# Patient Record
Sex: MALE | Race: BLACK OR AFRICAN AMERICAN | HISPANIC OR LATINO | URBAN - METROPOLITAN AREA
[De-identification: names, ages, dates, MRNs, and addresses within clinical notes are randomized per-mention and may not be internally consistent; named-entity substitution may affect disease eponyms.]

---

## 2023-04-29 ENCOUNTER — EMERGENCY (EMERGENCY)
Facility: HOSPITAL | Age: 23
LOS: 1 days | Discharge: HOME | End: 2023-04-29
Attending: EMERGENCY MEDICINE
Payer: MEDICAID

## 2023-04-29 VITALS
HEIGHT: 71 IN | DIASTOLIC BLOOD PRESSURE: 84 MMHG | SYSTOLIC BLOOD PRESSURE: 157 MMHG | HEART RATE: 94 BPM | TEMPERATURE: 99 F | RESPIRATION RATE: 17 BRPM | WEIGHT: 197.98 LBS | OXYGEN SATURATION: 99 %

## 2023-04-29 VITALS
DIASTOLIC BLOOD PRESSURE: 82 MMHG | TEMPERATURE: 99 F | OXYGEN SATURATION: 99 % | SYSTOLIC BLOOD PRESSURE: 132 MMHG | HEART RATE: 89 BPM | RESPIRATION RATE: 18 BRPM

## 2023-04-29 PROCEDURE — 99282 EMERGENCY DEPT VISIT SF MDM: CPT

## 2023-04-29 PROCEDURE — 99284 EMERGENCY DEPT VISIT MOD MDM: CPT

## 2023-04-29 RX ADMIN — Medication 200 MILLIGRAM(S): at 12:38

## 2023-04-29 NOTE — ED PROVIDER NOTE - OBJECTIVE STATEMENT
21yo M, denies medical history, presents with cough, HA, and sinus congestion for 1 week. States symptoms started while he was in the [place].  Has tried teas, tylenol, vit C, Vicks, and zinc with no relief. Denies any sick contacts. Feels like symptoms are about the same/slightly worsening. Also with subjective fevers. Denies any chest pain, shortness of breath, nausea, vomiting, blurry vision, abdominal pain.

## 2023-04-29 NOTE — ED ADULT NURSE NOTE - OBJECTIVE STATEMENT
pt recently returned from [facility].  he got sick with congeston and headache and sore throat while there.   he also lost his voice while there.  today he can talk but has eye pain and throat discomfort

## 2023-04-29 NOTE — ED PROVIDER NOTE - CLINICAL SUMMARY MEDICAL DECISION MAKING FREE TEXT BOX
23yo M, denies medical history, presents with cough, HA, and sinus congestion for 1 week. 23yo M, denies medical history, presents with cough, HA, and sinus congestion for 1 week. Vitals unremarkable. Physical exam unremarkable as well, lungs clear, heart rrr, abdomen soft, nontender. Likely viral URI, will give symptomatic relief, return precautions, likely dc

## 2023-04-29 NOTE — ED PROVIDER NOTE - PATIENT PORTAL LINK FT
You can access the FollowMyHealth Patient Portal offered by [facility] by registering at the following website: [email or web address]. By joining [facility]’s FollowMyHealth portal, you will also be able to view your health information using other applications (apps) compatible with our system.

## 2023-04-29 NOTE — ED PROVIDER NOTE - ATTENDING CONTRIBUTION TO CARE
21yo M, denies medical history, presents with cough, HA, and sinus congestion for 1 week [place] being in [place] contact with a family member there.  Patient did not swab for COVID or flu.  Patient denies any fever but has URI symptoms associated with sinus congestion and a mild right sided headache mild tenderness to palpation to the maxillary and frontal sinus well-appearing.  Did not take anything for headache today does not anything at this time.  Recommended decongestant supportive care otherwise follow-up.

## 2023-04-29 NOTE — ED ADULT NURSE NOTE - NSIMPLEMENTINTERV_GEN_ALL_ED
Implemented All Universal Safety Interventions:  [place] to call system. Call bell, personal items and telephone within reach. Instruct patient to call for assistance. Room bathroom lighting operational. Non-slip footwear when patient is off stretcher. Physically safe environment: no spills, clutter or unnecessary equipment. Stretcher in lowest position, wheels locked, appropriate side rails in place.

## 2023-04-29 NOTE — ED PROVIDER NOTE - NSFOLLOWUPINSTRUCTIONS_ED_ALL_ED_FT
Please try Claritin, sudafed for nasal decongestion and tylenol/motrin for the headache     Cold Symptoms    WHAT YOU NEED TO KNOW:    A cold is an infection caused by a virus. The infection causes your upper respiratory system to become inflamed. Common symptoms of a cold include sneezing, dry throat, a stuffy nose, headache, watery eyes, and a cough. Your cough may be dry, or you may cough up mucus. You may also have muscle aches, joint pain, and tiredness. Rarely, you may have a fever. Most colds go away without treatment.    DISCHARGE INSTRUCTIONS:    Return to the emergency department if:    You have increased tiredness and weakness.    You are unable to eat.    Your heart is beating much faster than usual for you.    You see white spots in the back of your throat and your neck is swollen and sore to the touch.    You see pinpoint or larger reddish-purple dots on your skin.  Contact your healthcare provider if:    You have a fever higher than 102°F (38.9°C).    You have new or worsening shortness of breath.    You have thick nasal drainage for more than 2 days.    Your symptoms do not improve or get worse within 5 days.    You have questions or concerns about your condition or care.  Medicines: The following medicines may be suggested by your healthcare provider to decrease your cold symptoms. These medicines are available without a doctor's order. Ask which medicines to take and when to take them. Follow directions.    NSAIDs or acetaminophen help to bring down a fever or decrease pain.    Decongestants help decrease nasal stuffiness.    Antihistamines help decrease sneezing and a runny nose.    Cough suppressants help decrease how much you cough.    Expectorants help loosen mucus so you can cough it up.    Take your medicine as directed. Contact your healthcare provider if you think your medicine is not helping or if you have side effects. Tell your provider if you are allergic to any medicine. Keep a list of the medicines, vitamins, and herbs you take. Include the amounts, and when and why you take them. Bring the list or the pill bottles to follow-up visits. Carry your medicine list with you in case of an emergency.  Symptom relief: The following may help relieve cold symptoms, such as a dry throat and congestion:    Gargle with mouthwash or warm salt water as directed.    Suck on throat lozenges or hard candy.    Use a cold or warm vaporizer or humidifier to ease your breathing.    Rest for at least 2 days and then as needed to decrease tiredness and weakness.    Use petroleum based jelly around your nostrils to decrease irritation from blowing your nose.  Drink liquids: Liquids will help thin and loosen thick mucus so you can cough it up. Liquids will also keep you hydrated. Ask your healthcare provider which liquids are best for you and how much to drink each day.    Prevent the spread of germs: You can spread your cold germs to others for at least 3 days after your symptoms start. Wash your hands often. Do not share items, such as eating utensils. Cover your nose and mouth when you cough or sneeze using the crook of your elbow instead of your hands. Throw used tissues in the garbage.    Do not smoke: Smoking may worsen your symptoms and increase the length of time you feel sick. Talk with your healthcare provider if you need help to stop smoking.    Follow up with your doctor as directed: Write down your questions so you remember to ask them during your visits.

## 2023-04-29 NOTE — ED ADULT TRIAGE NOTE - INTERNATIONAL TRAVEL DAYS 1
0-6 days ([place]) Libtayo Counseling- I discussed with the patient the risks of Libtayo including but not limited to nausea, vomiting, diarrhea, and bone or muscle pain.  The patient verbalized understanding of the proper use and possible adverse effects of Libtayo.  All of the patient's questions and concerns were addressed.

## 2023-04-29 NOTE — ED PROVIDER NOTE - NS ED ROS FT
CONSTITUTIONAL: +subjective fevers, no chills, no lightheadedness, no dizziness  EYES: no visual changes  EARS: no ear drainage, no ear pain, no change in hearing  NOSE: no nasal congestion  MOUTH/THROAT: no sore throat  CV: No chest pain, no palpitations  RESP: No SOB, +cough  GI: No n/v/d, no abd pain  : no dysuria, no hematuria, no flank pain  MSK: no back pain, no extremity pain  SKIN: no rashes  NEURO: +headache, no focal weakness, no decreased sensation/parasthesias   PSYCHIATRIC: no known mental health issues